# Patient Record
Sex: FEMALE | Race: WHITE | Employment: OTHER | URBAN - METROPOLITAN AREA
[De-identification: names, ages, dates, MRNs, and addresses within clinical notes are randomized per-mention and may not be internally consistent; named-entity substitution may affect disease eponyms.]

---

## 2018-04-17 ENCOUNTER — OFFICE VISIT (OUTPATIENT)
Dept: FAMILY MEDICINE CLINIC | Age: 68
End: 2018-04-17

## 2018-04-17 VITALS
HEIGHT: 60 IN | SYSTOLIC BLOOD PRESSURE: 135 MMHG | BODY MASS INDEX: 25.52 KG/M2 | WEIGHT: 130 LBS | DIASTOLIC BLOOD PRESSURE: 81 MMHG | HEART RATE: 74 BPM

## 2018-04-17 DIAGNOSIS — H65.191 OTHER ACUTE NONSUPPURATIVE OTITIS MEDIA OF RIGHT EAR, RECURRENCE NOT SPECIFIED: Primary | ICD-10-CM

## 2018-04-17 PROCEDURE — 99213 OFFICE O/P EST LOW 20 MIN: CPT | Performed by: INTERNAL MEDICINE

## 2018-04-17 RX ORDER — FLUTICASONE PROPIONATE 50 MCG
2 SPRAY, SUSPENSION (ML) NASAL DAILY
Qty: 1 BOTTLE | Refills: 3
Start: 2018-04-17

## 2018-04-17 RX ORDER — CEPHALEXIN 500 MG/1
500 CAPSULE ORAL 2 TIMES DAILY
Qty: 20 CAPSULE | Refills: 0 | Status: SHIPPED | OUTPATIENT
Start: 2018-04-17 | End: 2018-04-27

## 2018-04-17 ASSESSMENT — ENCOUNTER SYMPTOMS
SORE THROAT: 0
DIARRHEA: 0
NAUSEA: 0
ABDOMINAL PAIN: 0
COUGH: 1
VOMITING: 0
SHORTNESS OF BREATH: 0
CHEST TIGHTNESS: 0
BLOOD IN STOOL: 0
RHINORRHEA: 0
CONSTIPATION: 0

## 2018-04-17 ASSESSMENT — PATIENT HEALTH QUESTIONNAIRE - PHQ9
2. FEELING DOWN, DEPRESSED OR HOPELESS: 0
1. LITTLE INTEREST OR PLEASURE IN DOING THINGS: 0
SUM OF ALL RESPONSES TO PHQ QUESTIONS 1-9: 0
SUM OF ALL RESPONSES TO PHQ9 QUESTIONS 1 & 2: 0

## 2024-12-16 ENCOUNTER — HOSPITAL ENCOUNTER (OUTPATIENT)
Age: 74
Discharge: HOME | End: 2024-12-16
Payer: SELF-PAY

## 2024-12-16 DIAGNOSIS — M54.16: Primary | ICD-10-CM

## 2024-12-16 PROCEDURE — 72148 MRI LUMBAR SPINE W/O DYE: CPT

## 2024-12-16 NOTE — MRI_ITS
REPORT-ID:[id number] 
 
STUDY:   MRI LUMBAR SPINE WITHOUT CONTRAST 
 
REASON FOR EXAM:   Female, 74 years old.  lumbar radiculopathy 
 
TECHNIQUE:   Standardized fat and water weighted pulse sequences were 
obtained in the sagittal and axial planes. 
 
COMPARISON:   X-ray 11/22/2024 
___________________________________ 
 
FINDINGS: 
 
T12-L1:  Normal endplates.  Normal disc height, hydration and morphology. 
Normal bilateral facet joints.  Normal central canal and bilateral lateral 
recesses.  Normal bilateral intervertebral neural foramina. 
 
Normal lumbar lordosis.  There is no substantial scoliosis.  Normal conus 
medullaris that terminates at the L1. Multiple T1 hyperintense and T2 
hyperintense lesions throughout the lumbar spine consistent with 
hemangiomas. 
 
L1-2:  Normal endplates.  Normal disc height, hydration and morphology. 
Normal bilateral facet joints.  Normal central canal and bilateral lateral 
recesses.  Normal bilateral intervertebral neural foramina. 
 
L2-3:  Mild bilateral facet hypertrophy and moderate ligament flavum 
hypertrophy. Mild bilobed disc protrusion produces mild spinal stenosis and 
mild bilateral neural foraminal stenosis. 
 
L3-4:  Mild bilateral facet hypertrophy and moderate ligament flavum 
hypertrophy. Moderate broad disc protrusion produces moderate spinal 
stenosis and mild bilateral neural foraminal stenosis. 
 
L4-5:  Mild bilateral facet hypertrophy and ligament flavum hypertrophy. 
Disc desiccation but no disc protrusion, spinal stenosis, or neural 
foraminal stenosis. 
 
L5-S1:  Mild bilateral facet hypertrophy and ligament flavum hypertrophy. 
Disc desiccation but no disc protrusion, spinal stenosis, or neural 
foraminal stenosis. 
 
Normal visualized sacral ala. 
 
Normal visualized paraspinous soft tissue structures. 
___________________________________ 
 
ORDER #: [id number] MRI/Spine  Lumbar (Routine)  
IMPRESSION:  
Multilevel degenerative changes, as described above.  
 
  
Electronically Signed:  
Bashir Riojas MD  
2024/12/16 at 10:41 [facility]  
Reading Location ID and State: [address] / [place]  
Tel [phone], Service support  [phone], Fax [phone]